# Patient Record
Sex: MALE | Race: WHITE
[De-identification: names, ages, dates, MRNs, and addresses within clinical notes are randomized per-mention and may not be internally consistent; named-entity substitution may affect disease eponyms.]

---

## 2019-07-15 PROBLEM — Z00.00 ENCOUNTER FOR PREVENTIVE HEALTH EXAMINATION: Status: ACTIVE | Noted: 2019-07-15

## 2019-07-16 ENCOUNTER — APPOINTMENT (OUTPATIENT)
Dept: ORTHOPEDIC SURGERY | Facility: CLINIC | Age: 34
End: 2019-07-16
Payer: COMMERCIAL

## 2019-07-16 VITALS
BODY MASS INDEX: 27.34 KG/M2 | DIASTOLIC BLOOD PRESSURE: 84 MMHG | HEART RATE: 61 BPM | WEIGHT: 213 LBS | SYSTOLIC BLOOD PRESSURE: 135 MMHG | HEIGHT: 74 IN

## 2019-07-16 DIAGNOSIS — Z78.9 OTHER SPECIFIED HEALTH STATUS: ICD-10-CM

## 2019-07-16 DIAGNOSIS — M60.9 MYOSITIS, UNSPECIFIED: ICD-10-CM

## 2019-07-16 DIAGNOSIS — M47.816 SPONDYLOSIS W/OUT MYELOPATHY OR RADICULOPATHY, LUMBAR REGION: ICD-10-CM

## 2019-07-16 PROCEDURE — 99204 OFFICE O/P NEW MOD 45 MIN: CPT

## 2019-07-16 PROCEDURE — 72100 X-RAY EXAM L-S SPINE 2/3 VWS: CPT

## 2019-07-16 RX ORDER — METHYLPREDNISOLONE 4 MG/1
4 TABLET ORAL
Qty: 1 | Refills: 0 | Status: ACTIVE | COMMUNITY
Start: 2019-07-16 | End: 1900-01-01

## 2019-07-16 NOTE — HISTORY OF PRESENT ILLNESS
[de-identified] : 34 year old M presents with lumbar spine pain. A couple of neck injuries, treated by chiropractic. States he has an L4-L5 protruding disc. A couple of weeks ago he started having an exacerbation of low back pain, described as stiffness, shooting, throbbing. His usual routine including stretching did not relieve his signs and symptoms. He has had no injections, PT, chiropractic, for his low back. Occasional LLE sciatica. He has varicose veins, and states he has some numbness on the interior aspect of his feet.  [Ataxia] : no ataxia [Incontinence] : no incontinence [Loss of Dexterity] : good dexterity [Urinary Ret.] : no urinary retention

## 2019-07-16 NOTE — DISCUSSION/SUMMARY
[de-identified] : I have recommended that the pt continue with a conservative treatment plan. Pt has been educated on the multifactorial nature of low back pain. I encourage physical modalities including chiropractic which was successful in the past for his neck. Pt can use anti-inflammatories / Tylenol PRN. We also spoke about the benefits of using heat, ice, and Bengay cream. Continue stretching routine. Pt can also use an OTC lumbar brace which I think he may find beneficial. The patient will follow up in 6 weeks for a repeat clinical assessment if his signs and symptoms persist. Follow up with PCP for varicose veins.

## 2019-07-16 NOTE — PHYSICAL EXAM
[Poor Appearance] : well-appearing [Acute Distress] : not in acute distress [Obese] : not obese [de-identified] : CONSTITUTIONAL: The patient is a very pleasant individual who is well-nourished and who appears stated age.\par PSYCHIATRIC: The patient is alert and oriented X 3 and in no apparent distress, and participates with orthopedic evaluation well.\par HEAD: Atraumatic and is nonsyndromic in appearance.\par EENT: No visible thyromegaly, EOMI.\par RESPIRATORY: Respiratory rate is regular, not dyspneic on examination.\par LYMPHATICS: There is no inguinal lymphadenopathy\par INTEGUMENTARY: Skin is clean, dry, and intact about the bilateral lower extremities and lumbar spine.\par VASCULAR: There is brisk capillary refill about the bilateral lower extremities.\par NEUROLOGIC: There are no pathologic reflexes. There is no decrease in sensation of the bilateral lower extremities on Wartenberg pinwheel examination. Deep tendon reflexes are well maintained at 2+/4 of the bilateral lower extremities and are symmetric.\par MUSCULOSKELETAL: There is no visible muscular atrophy. Manual motor strength is well maintained in the bilateral lower extremities. Range of motion of lumbar spine is well maintained. The patient ambulates in a non-myelopathic manner. Negative tension sign and straight leg raise bilaterally. Quad extension, ankle dorsiflexion, EHL, plantar flexion, and ankle eversion are well preserved. Normal secondary orthopaedic exam of bilateral hips, greater trochanteric area, knees and ankles \par \par Mechanically orientated low back pain. no radiculopathy. gluteal tendinopathy.  [de-identified] : X-ray of the lumbar spine taken today shows normal lumbar XR with some mild L4-L5 lumbar DDD.

## 2019-07-16 NOTE — ADDENDUM
[FreeTextEntry1] : Documented by Mack Hernández acting as a scribe for Dr. Dejan Mobley on 07/16/2019 . All medical record entries made by the Scribe were at my, Dr. Dejan Mobley, direction and personally dictated by me on 07/16/2019 . I have reviewed the chart and agree that the record accurately reflects my personal performance of the history, physical exam, assessment and plan. I have also personally directed, reviewed, and agreed with the chart.

## 2020-06-29 ENCOUNTER — TRANSCRIPTION ENCOUNTER (OUTPATIENT)
Age: 35
End: 2020-06-29

## 2020-07-01 ENCOUNTER — TRANSCRIPTION ENCOUNTER (OUTPATIENT)
Age: 35
End: 2020-07-01

## 2021-08-05 ENCOUNTER — EMERGENCY (EMERGENCY)
Facility: HOSPITAL | Age: 36
LOS: 0 days | Discharge: HOME | End: 2021-08-05
Attending: EMERGENCY MEDICINE
Payer: OTHER MISCELLANEOUS

## 2021-08-05 VITALS — WEIGHT: 216.93 LBS | HEIGHT: 74 IN

## 2021-08-05 VITALS
SYSTOLIC BLOOD PRESSURE: 113 MMHG | RESPIRATION RATE: 17 BRPM | DIASTOLIC BLOOD PRESSURE: 80 MMHG | TEMPERATURE: 99 F | HEART RATE: 74 BPM | OXYGEN SATURATION: 100 %

## 2021-08-05 DIAGNOSIS — Z23 ENCOUNTER FOR IMMUNIZATION: ICD-10-CM

## 2021-08-05 DIAGNOSIS — M25.561 PAIN IN RIGHT KNEE: ICD-10-CM
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
## 2021-08-05 DIAGNOSIS — S81.011A LACERATION WITHOUT FOREIGN BODY, RIGHT KNEE, INITIAL ENCOUNTER: ICD-10-CM

## 2021-08-05 PROCEDURE — 90471 IMMUNIZATION ADMIN: CPT

## 2021-08-05 PROCEDURE — 99283 EMERGENCY DEPT VISIT LOW MDM: CPT

## 2021-08-05 PROCEDURE — 99283 EMERGENCY DEPT VISIT LOW MDM: CPT | Mod: 25

## 2021-08-05 PROCEDURE — 90715 TDAP VACCINE 7 YRS/> IM: CPT

## 2021-08-05 RX ORDER — TETANUS TOXOID, REDUCED DIPHTHERIA TOXOID AND ACELLULAR PERTUSSIS VACCINE, ADSORBED 5; 2.5; 8; 8; 2.5 [IU]/.5ML; [IU]/.5ML; UG/.5ML; UG/.5ML; UG/.5ML
0.5 SUSPENSION INTRAMUSCULAR ONCE
Refills: 0 | Status: COMPLETED | OUTPATIENT
Start: 2021-08-05 | End: 2021-08-05

## 2021-08-05 RX ADMIN — TETANUS TOXOID, REDUCED DIPHTHERIA TOXOID AND ACELLULAR PERTUSSIS VACCINE, ADSORBED 0.5 MILLILITER(S): 5; 2.5; 8; 8; 2.5 SUSPENSION INTRAMUSCULAR at 19:28

## 2021-08-05 NOTE — ED STATDOCS - NSFOLLOWUPINSTRUCTIONS_ED_ALL_ED_FT
Keep the area clean with soap and water.    Follow up with Employee Health for further evaluation of the blood work.

## 2021-08-05 NOTE — ED STATDOCS - OBJECTIVE STATEMENT
37 y/o male with no relevant PMHx presents to the ED c/o laceration to right knee. Pt is a radiology technician here, kneeled down on the ground and kneeled on a shard of glass. Pt here for a tetanus shot because he does not know if it is up to date. NKDA. No other complaints

## 2021-08-05 NOTE — ED STATDOCS - PATIENT PORTAL LINK FT
You can access the FollowMyHealth Patient Portal offered by [facility] by registering at the following website: [email or web address]. By joining [facility]’s FollowMyHealth portal, you will also be able to view your health information using other applications (apps) compatible with our system.

## 2021-08-05 NOTE — ED STATDOCS - PROGRESS NOTE DETAILS
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 Pt knelt down after a pt was brought in that had shattered glass around and as he knelt down he knelt on a piece of glass that went into his R knee. Pt pulled the glass out. No FB palpated under the skin and no active bleeding. Last tdap unknown and pt to fill out the blue employee folder for possible blood exposure. 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 Pt knelt down after a pt was brought in that had shattered glass around them and as he knelt down, he knelt on a piece of glass that went into his R knee. Pt pulled the glass out. No FB palpated under the skin and no active bleeding. Last tdap unknown. Will update tdap and pt was given the blue employee folder for possible blood exposure. -Darius Bañuelos PA-C

## 2021-08-05 NOTE — ED STATDOCS - CLINICAL SUMMARY MEDICAL DECISION MAKING FREE TEXT BOX
35 yo male presents with knee abrasion s/p kneeling on a piece of glass after a pt was brought to the ER. Will update tdap, labs, and d/c home. -Darius Bañuelos PA-C

## 2021-08-05 NOTE — ED STATDOCS - CARE PLAN
Principal Discharge DX:	Knee abrasion, left, initial encounter  Secondary Diagnosis:	Exposure to blood or body fluid

## 2021-08-05 NOTE — ED ADULT TRIAGE NOTE - CHIEF COMPLAINT QUOTE
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 Pt reports bleeding after removal of shard. Wound currently dressed.